# Patient Record
Sex: MALE | Race: WHITE | NOT HISPANIC OR LATINO | Employment: OTHER | URBAN - NONMETROPOLITAN AREA
[De-identification: names, ages, dates, MRNs, and addresses within clinical notes are randomized per-mention and may not be internally consistent; named-entity substitution may affect disease eponyms.]

---

## 2020-05-30 ENCOUNTER — HOSPITAL ENCOUNTER (EMERGENCY)
Facility: OTHER | Age: 70
Discharge: HOME | End: 2020-05-30
Attending: EMERGENCY MEDICINE | Admitting: EMERGENCY MEDICINE
Payer: COMMERCIAL

## 2020-05-30 VITALS
OXYGEN SATURATION: 98 % | TEMPERATURE: 99.2 F | WEIGHT: 180 LBS | SYSTOLIC BLOOD PRESSURE: 131 MMHG | HEART RATE: 63 BPM | DIASTOLIC BLOOD PRESSURE: 74 MMHG | RESPIRATION RATE: 23 BRPM

## 2020-05-30 DIAGNOSIS — E83.52 HYPERCALCEMIA: ICD-10-CM

## 2020-05-30 DIAGNOSIS — R10.13 EPIGASTRIC PAIN: ICD-10-CM

## 2020-05-30 DIAGNOSIS — R11.0 NAUSEA: ICD-10-CM

## 2020-05-30 LAB
ALBUMIN SERPL-MCNC: 4 G/DL (ref 3.5–5.7)
ALBUMIN UR-MCNC: 10 MG/DL
ALP SERPL-CCNC: 58 U/L (ref 34–104)
ALT SERPL W P-5'-P-CCNC: 12 U/L (ref 7–52)
ANION GAP SERPL CALCULATED.3IONS-SCNC: 10 MMOL/L (ref 3–14)
APPEARANCE UR: CLEAR
AST SERPL W P-5'-P-CCNC: 17 U/L (ref 13–39)
BACTERIA #/AREA URNS HPF: ABNORMAL /HPF
BASOPHILS # BLD AUTO: 0 10E9/L (ref 0–0.2)
BASOPHILS NFR BLD AUTO: 0.2 %
BILIRUB SERPL-MCNC: 1.2 MG/DL (ref 0.3–1)
BILIRUB UR QL STRIP: NEGATIVE
BUN SERPL-MCNC: 20 MG/DL (ref 7–25)
CALCIUM SERPL-MCNC: 10.7 MG/DL (ref 8.6–10.3)
CHLORIDE SERPL-SCNC: 103 MMOL/L (ref 98–107)
CO2 SERPL-SCNC: 30 MMOL/L (ref 21–31)
COLOR UR AUTO: YELLOW
CREAT SERPL-MCNC: 0.98 MG/DL (ref 0.7–1.3)
CRP SERPL-MCNC: 1.1 MG/L
DIFFERENTIAL METHOD BLD: ABNORMAL
EOSINOPHIL # BLD AUTO: 0 10E9/L (ref 0–0.7)
EOSINOPHIL NFR BLD AUTO: 0.2 %
ERYTHROCYTE [DISTWIDTH] IN BLOOD BY AUTOMATED COUNT: 13 % (ref 10–15)
GFR SERPL CREATININE-BSD FRML MDRD: 75 ML/MIN/{1.73_M2}
GLUCOSE SERPL-MCNC: 124 MG/DL (ref 70–105)
GLUCOSE UR STRIP-MCNC: NEGATIVE MG/DL
HCT VFR BLD AUTO: 39.9 % (ref 40–53)
HGB BLD-MCNC: 13.4 G/DL (ref 13.3–17.7)
HGB UR QL STRIP: NEGATIVE
IMM GRANULOCYTES # BLD: 0 10E9/L (ref 0–0.4)
IMM GRANULOCYTES NFR BLD: 0.3 %
KETONES UR STRIP-MCNC: 40 MG/DL
LEUKOCYTE ESTERASE UR QL STRIP: NEGATIVE
LYMPHOCYTES # BLD AUTO: 0.9 10E9/L (ref 0.8–5.3)
LYMPHOCYTES NFR BLD AUTO: 7.7 %
MCH RBC QN AUTO: 33.6 PG (ref 26.5–33)
MCHC RBC AUTO-ENTMCNC: 33.6 G/DL (ref 31.5–36.5)
MCV RBC AUTO: 100 FL (ref 78–100)
MONOCYTES # BLD AUTO: 0.9 10E9/L (ref 0–1.3)
MONOCYTES NFR BLD AUTO: 7.7 %
MUCOUS THREADS #/AREA URNS LPF: PRESENT /LPF
NEUTROPHILS # BLD AUTO: 10.1 10E9/L (ref 1.6–8.3)
NEUTROPHILS NFR BLD AUTO: 83.9 %
NITRATE UR QL: NEGATIVE
PH UR STRIP: 6 PH (ref 5–7)
PLATELET # BLD AUTO: 204 10E9/L (ref 150–450)
POTASSIUM SERPL-SCNC: 3.5 MMOL/L (ref 3.5–5.1)
PROT SERPL-MCNC: 6.5 G/DL (ref 6.4–8.9)
RBC # BLD AUTO: 3.99 10E12/L (ref 4.4–5.9)
RBC #/AREA URNS AUTO: <1 /HPF (ref 0–2)
SODIUM SERPL-SCNC: 143 MMOL/L (ref 134–144)
SOURCE: ABNORMAL
SP GR UR STRIP: 1.03 (ref 1–1.03)
UROBILINOGEN UR STRIP-MCNC: NORMAL MG/DL (ref 0–2)
WBC # BLD AUTO: 12 10E9/L (ref 4–11)
WBC #/AREA URNS AUTO: 4 /HPF (ref 0–5)

## 2020-05-30 PROCEDURE — 96374 THER/PROPH/DIAG INJ IV PUSH: CPT | Performed by: EMERGENCY MEDICINE

## 2020-05-30 PROCEDURE — 25000128 H RX IP 250 OP 636: Performed by: EMERGENCY MEDICINE

## 2020-05-30 PROCEDURE — 99283 EMERGENCY DEPT VISIT LOW MDM: CPT | Mod: Z6 | Performed by: EMERGENCY MEDICINE

## 2020-05-30 PROCEDURE — 25800030 ZZH RX IP 258 OP 636: Performed by: EMERGENCY MEDICINE

## 2020-05-30 PROCEDURE — 85025 COMPLETE CBC W/AUTO DIFF WBC: CPT | Performed by: EMERGENCY MEDICINE

## 2020-05-30 PROCEDURE — 25000132 ZZH RX MED GY IP 250 OP 250 PS 637: Performed by: EMERGENCY MEDICINE

## 2020-05-30 PROCEDURE — 36415 COLL VENOUS BLD VENIPUNCTURE: CPT | Performed by: EMERGENCY MEDICINE

## 2020-05-30 PROCEDURE — 99284 EMERGENCY DEPT VISIT MOD MDM: CPT | Mod: 25 | Performed by: EMERGENCY MEDICINE

## 2020-05-30 PROCEDURE — 86140 C-REACTIVE PROTEIN: CPT | Performed by: EMERGENCY MEDICINE

## 2020-05-30 PROCEDURE — 81001 URINALYSIS AUTO W/SCOPE: CPT | Performed by: EMERGENCY MEDICINE

## 2020-05-30 PROCEDURE — 80053 COMPREHEN METABOLIC PANEL: CPT | Performed by: EMERGENCY MEDICINE

## 2020-05-30 PROCEDURE — 96365 THER/PROPH/DIAG IV INF INIT: CPT | Performed by: EMERGENCY MEDICINE

## 2020-05-30 RX ORDER — METOCLOPRAMIDE HYDROCHLORIDE 5 MG/ML
5 INJECTION INTRAMUSCULAR; INTRAVENOUS ONCE
Status: DISCONTINUED | OUTPATIENT
Start: 2020-05-30 | End: 2020-05-30

## 2020-05-30 RX ORDER — FAMOTIDINE 20 MG/1
20 TABLET, FILM COATED ORAL 2 TIMES DAILY
Status: DISCONTINUED | OUTPATIENT
Start: 2020-05-30 | End: 2020-05-30

## 2020-05-30 RX ORDER — FAMOTIDINE 20 MG/1
20 TABLET, FILM COATED ORAL ONCE
Status: COMPLETED | OUTPATIENT
Start: 2020-05-30 | End: 2020-05-30

## 2020-05-30 RX ORDER — METOCLOPRAMIDE HYDROCHLORIDE 5 MG/ML
5 INJECTION INTRAMUSCULAR; INTRAVENOUS ONCE
Status: COMPLETED | OUTPATIENT
Start: 2020-05-30 | End: 2020-05-30

## 2020-05-30 RX ORDER — IBUPROFEN 200 MG
400 TABLET ORAL
COMMUNITY

## 2020-05-30 RX ORDER — ASPIRIN 325 MG
325 TABLET ORAL
COMMUNITY

## 2020-05-30 RX ADMIN — METOCLOPRAMIDE 5 MG: 5 INJECTION, SOLUTION INTRAMUSCULAR; INTRAVENOUS at 21:38

## 2020-05-30 RX ADMIN — FAMOTIDINE 20 MG: 20 TABLET ORAL at 21:34

## 2020-05-30 RX ADMIN — SODIUM CHLORIDE, POTASSIUM CHLORIDE, SODIUM LACTATE AND CALCIUM CHLORIDE 500 ML: 600; 310; 30; 20 INJECTION, SOLUTION INTRAVENOUS at 22:02

## 2020-05-30 RX ADMIN — FAMOTIDINE 20 MG: 20 TABLET ORAL at 22:29

## 2020-05-30 NOTE — ED AVS SNAPSHOT
[facility]  [address]  [place] [address]  Phone:  [phone]  Fax:  [phone]                                    Cam Hammonds   MRN: [id number]    Department:  [facility]   Date of Visit:  5/30/2020           After Visit Summary Signature Page    I have received my discharge instructions, and my questions have been answered. I have discussed any challenges I see with this plan with the nurse or doctor.    ..........................................................................................................................................  Patient/Patient Representative Signature      ..........................................................................................................................................  Patient Representative Print Name and Relationship to Patient    ..................................................               ................................................  Date                                   Time    ..........................................................................................................................................  Reviewed by Signature/Title    ...................................................              ..............................................  Date                                               Time          22EPIC Rev 08/18

## 2020-05-31 NOTE — ED TRIAGE NOTES
Pt arrives to the ED via private car.  Pt states that last night he got bloated, cramping and got worse as the night went on ..  Pt started vomiting that night and got the hiccups.  Pt states the only thing that stopped them was throwing up.  Pt has a hx of acid reflux and states he has pain going across his upper abdomen.  Pt did a virtual visit wit his doc and she gave him a scrip for nausea and this didn't help so his MD stated he should be seen in the ER.

## 2020-05-31 NOTE — ED PROVIDER NOTES
"  History     Chief Complaint   Patient presents with     Abdominal Pain     HPI  Cam Hammonds is a 70 year old male who presented to the emergency department by private vehicle for evaluation of abdominal pain.    Symptoms again around 1700 on 5/29/2020.  This was about 4 hours after his midday meal and came on just as he was having a beer and eating some nuts.  He reports initial symptom was abdominal bloating.  This was followed shortly thereafter by a 5 inch wide band running transversely across his abdomen at the infra costal margin from the right anterior axillary line to the left anterior axillary line.  At no point was the radiation into the chest or into the back or below the level of the umbilicus.  He reports it was a dull ache.  It was intermittent most of the episodes lasted no more than \"10 to 15 seconds\".  He describes it as \"big build up and then it would relax\".  He reports that he would have some episodes back to back but otherwise he could go up to 30 minutes between the associated episodes.  Associated with this was an escalation of his typical gastroesophageal reflux pain which he describes as sharp and burning and he reports that concurrent with the gastroesophageal reflux symptoms he would experience hiccups.  He reports that he discovered that I could \"stop the pickups by forcing myself to vomit\".  He reports that these were not normal hiccups.  He reports that they were associated with a sense that his \"stomach was convulsing\".    The patient reports there is no fever.  He had chills but no rigors.  He reports he has been experiencing stools that are normal for him in terms of frequency and consistency.  No hematochezia or melena.  He denies dysuria or change in urinary frequency or urgency.  No hematuria.  His stream is diminished but he reports it is also unchanged.  As noted there is no complaint of back pain either in midline or para vertebral.  He has had some nausea but no " vomiting that is not self-induced.  No hematemesis or coffee-ground emesis.    The patient reports that overnight he noted that his symptoms were much worse sleeping on his left side more so than the right side.  He reports he is customarily a side sleeper.    The patient has a history of gastroesophageal reflux extending back over period of at least 10 years.  He reports that with careful attention to diet the frequency of this is very low and he estimates that he has had less than 1 episode a week over the last month.  He does report that on on 5/28/2020 he had a fried chicken sandwich and experienced a significant exacerbation of his reflux.  He had to take multiple doses of Tums which provided short-term relief.  He denies any prior history of upper GI tract ulcerative disease.  Risk factors are negative for tobacco.  He does take 325 mg a day of aspirin although he is not certain why he does this and it may well be that this was for primary coronary artery prophylaxis.  If that is so I had a long chat with him about the evidence of this past year suggesting that there is no benefit for primary prophylaxis of coronary ischemia.  I encouraged him to have a discussion with his primary care provider about why he is taking his.  He also consumes at least 1 beer or 1 glass of wine or 1 glass of scotch each day but rarely anything in excess of that.  No history of pancreatitis.  No known history of cholelithiasis or cholecystitis.  No history of inflammatory bowel disease or irritable bowel syndrome.  He reports that 2 years ago he had no sort of food poisoning but that was associated with vomiting and diarrhea and resolved in a period of less than 48 hours.  The only similarity he notes is that some of the pain he is experiencing now is reminiscent of that.  His wife, however,  has not been ill with any similar symptoms.        Allergies:  No Known Allergies    Problem List:    Patient Active Problem List     Diagnosis Date Noted     NSVT (nonsustained ventricular tachycardia) (H) 06/29/2015     Priority: Medium     Acute deep vein thrombosis (DVT) of popliteal vein of left lower extremity (H) 06/15/2015     Priority: Medium     Elevated BP 06/15/2015     Priority: Medium     Macrocytosis without anemia 11/17/2014     Priority: Medium     S/P arthroscopy of shoulder 04/25/2013     Priority: Medium     Complete tear of right rotator cuff 01/16/2013     Priority: Medium     DJD (degenerative joint disease) 03/01/2010     Priority: Medium        Past Medical History:    History reviewed. No pertinent past medical history.    Past Surgical History:    History reviewed. No pertinent surgical history.    Family History:    History reviewed. No pertinent family history.    Social History:  Marital Status:   [2]  Social History     Tobacco Use     Smoking status: Never Smoker     Smokeless tobacco: Never Used   Substance Use Topics     Alcohol use: Yes     Drug use: Never        Medications:    aspirin (ASA) 325 MG tablet  ibuprofen (ADVIL/MOTRIN) 200 MG tablet  MULTIPLE VITAMIN-FOLIC ACID PO  rivaroxaban ANTICOAGULANT (XARELTO) 20 MG TABS tablet  VITAMIN D, CHOLECALCIFEROL, PO          Review of Systemsas noted above    Physical Exam   BP: 124/70  Pulse: 90  Heart Rate: 85  Temp: 99.2  F (37.3  C)  Resp: 16  Weight: 81.6 kg (180 lb)  SpO2: 97 %      Physical Exam The patient's skin is warm and dry. There are no petechiae or purpurae. There is no pallor. There is no jaundice. The skin is intact.  Nailbed capillary refill is brisk. Radial pulses are palpable and are 2+ bilaterally. Dorsalis pedis pulses are also brisk and equal. There is no peripheral edema.  Heart is regular in its rhythm.  Bedside monitor shows him to be in a normal sinus rhythm with rare unifocal PVCs it is regular in its rate. The patient has a normal S1 and S2. No murmur is noted.   Lungs are clear to auscultation. Airflow is adequate. No adventitious  airway sounds noted. The patient is able to speak in full sentences. No lip pursing or expiratory grunting is noted. No accessory muscle use was noted.  Chest wall stable.  No diaphragmatic spasm is observed during the interview or the examination.  Bowel tones are present. There is no high-pitched tinkling. There are no continuous rushes. Belly is not bloated or distended. There is no guarding.  Cicatrix is noted consistent with a prior open cholecystectomy.  Patient reports this happened 40 years ago.  There is  focal tenderness for the abdomen with palpation at a site that is 2-3 fingerbreadths below the costal margin in the midclavicular line and just in the left xiphocostal recess.  Abdominal aorta is palpated.  It is not enlarged.  It is not tender.  Palpation over the area of the takeoff for the left hepatic artery from the celiac artery reportedly makes his reflux/epigastric pain worse but otherwise the exam is nontender.  There is no auscultable bruit noted over the abdominal aorta nor over the renal vessels.. No suprapubic distention or tenderness is noted.  McBurney's sign is negative. Rovsing's sign is negative. Smith's sign is negative. No discrete mass was identified. I did not appreciate any hepatomegaly by palpation or by percussion. No ventral hernia was identified.   Carotids are palpable. There is no auscultable bruit. Cervical AROM is adequate for flexion and rotation. The patient's mandible opened in a symmetric fashion. There is no trismus. There is no swelling of the lips, tongue, or the uvula.  Oral mucosa is moist.  There were no intraoral mucosal lesions identified.  Conjunctivae are neither injected nor pallid. Sclerae are anicteric. Pupils are equal in size. They are 1-2 mm, OU. Gaze is conjugate. Purkinje images are well aligned.   Patient is awake and alert. Speech is fluent and clear. Cognition is sufficient to construct a linear narrative. Fund of knowledge is thought to represent the  patient's baseline. Affect is bright. The patient is cooperative and follows simple commands.  No evidence for any bulbar or peripheral motor deficit.  No evidence for truncal or limb ataxia.    ED Course        Procedures                 Results for orders placed or performed during the hospital encounter of 05/30/20 (from the past 24 hour(s))   UA reflex to Microscopic and Culture    Specimen: Urine clean catch; Midstream Urine   Result Value Ref Range    Color Urine Yellow     Appearance Urine Clear     Glucose Urine Negative NEG^Negative mg/dL    Bilirubin Urine Negative NEG^Negative    Ketones Urine 40 (A) NEG^Negative mg/dL    Specific Gravity Urine 1.028 1.003 - 1.035    Blood Urine Negative NEG^Negative    pH Urine 6.0 5.0 - 7.0 pH    Protein Albumin Urine 10 (A) NEG^Negative mg/dL    Urobilinogen mg/dL Normal 0.0 - 2.0 mg/dL    Nitrite Urine Negative NEG^Negative    Leukocyte Esterase Urine Negative NEG^Negative    Source Midstream Urine     RBC Urine <1 0 - 2 /HPF    WBC Urine 4 0 - 5 /HPF    Bacteria Urine Few (A) NEG^Negative /HPF    Mucous Urine Present (A) NEG^Negative /LPF   Comprehensive metabolic panel   Result Value Ref Range    Sodium 143 134 - 144 mmol/L    Potassium 3.5 3.5 - 5.1 mmol/L    Chloride 103 98 - 107 mmol/L    Carbon Dioxide 30 21 - 31 mmol/L    Anion Gap 10 3 - 14 mmol/L    Glucose 124 (H) 70 - 105 mg/dL    Urea Nitrogen 20 7 - 25 mg/dL    Creatinine 0.98 0.70 - 1.30 mg/dL    GFR Estimate 75 >60 mL/min/[1.73_m2]    GFR Estimate If Black >90 >60 mL/min/[1.73_m2]    Calcium 10.7 (H) 8.6 - 10.3 mg/dL    Bilirubin Total 1.2 (H) 0.3 - 1.0 mg/dL    Albumin 4.0 3.5 - 5.7 g/dL    Protein Total 6.5 6.4 - 8.9 g/dL    Alkaline Phosphatase 58 34 - 104 U/L    ALT 12 7 - 52 U/L    AST 17 13 - 39 U/L   CRP inflammation   Result Value Ref Range    CRP Inflammation 1.1 (H) <0.5 mg/L   CBC with platelets differential   Result Value Ref Range    WBC 12.0 (H) 4.0 - 11.0 10e9/L    RBC Count 3.99 (L) 4.4  - 5.9 10e12/L    Hemoglobin 13.4 13.3 - 17.7 g/dL    Hematocrit 39.9 (L) 40.0 - 53.0 %     78 - 100 fl    MCH 33.6 (H) 26.5 - 33.0 pg    MCHC 33.6 31.5 - 36.5 g/dL    RDW 13.0 10.0 - 15.0 %    Platelet Count 204 150 - 450 10e9/L    Diff Method Automated Method     % Neutrophils 83.9 %    % Lymphocytes 7.7 %    % Monocytes 7.7 %    % Eosinophils 0.2 %    % Basophils 0.2 %    % Immature Granulocytes 0.3 %    Absolute Neutrophil 10.1 (H) 1.6 - 8.3 10e9/L    Absolute Lymphocytes 0.9 0.8 - 5.3 10e9/L    Absolute Monocytes 0.9 0.0 - 1.3 10e9/L    Absolute Eosinophils 0.0 0.0 - 0.7 10e9/L    Absolute Basophils 0.0 0.0 - 0.2 10e9/L    Abs Immature Granulocytes 0.0 0 - 0.4 10e9/L       Medications   metoclopramide (REGLAN) injection 5 mg (5 mg Intravenous Given 5/30/20 2138)   lactated ringers BOLUS 500 mL (0 mLs Intravenous Stopped 5/30/20 2232)   famotidine (PEPCID) tablet 20 mg (20 mg Oral Given 5/30/20 2229)       Assessments & Plan (with Medical Decision Making)     I have reviewed the nursing notes.    I have reviewed the findings, diagnosis, plan and need for follow up with the patient.  From the patient's history his epigastric pain is consistent with his prior episodes of gastroesophageal reflux.  This reflux had been well controlled until 2 days ago when he had fried chicken sandwich.  He has been having worsening symptoms since then which have been responsive to frequent use of Tums but without any sustained relief.  The patient has no prior history for upper GI tract ulcerative disease but between his daily alcohol consumption and his daily aspirin consumption does have some risk factors for the development of gastritis, at the least.  There is nothing to indicate that his symptoms are due to hepatic injury.  He is already had his gallbladder removed and that is 40 years ago my suspicion that he is developed an acute choledocholithiasis is relatively low.  There are no suggestions for acute ascending  cholangitis.  The patient does not have significant risk factors for pancreatitis nor his symptoms suggestive of that.  I do not think that his symptoms represent a subpulmonic empyema nor do I think they represent pneumonia or mediastinitis, myocardial ischemia/injury or pericarditis.  The fact that he does have recurrent hiccups associated with his pain is bit perplexing.  As noted above I do not think of any problems above the level of the diaphragm such as lungs or pericardium as likely sources of inflammation to the diaphragm causing its irritation.  And I have excluded stomach and I think spleen as potential sources of infra diaphragmatic irritation.  I do not think this is related to anything in his large bowel since he has no other lower GI symptomatology and so I can only imagine some inflammation from the stomach.  There is nothing in his neck that would suggest a problem at the level of C3, 4 or 5 to suggest that his he has phrenic nerve injury as an explanation for this.  At this time his hiccups are recurrent but transient and I did not think that he merited a dose of Thorazine.    As for his very modest hypercalcemia tonight, I am choosing not to pursue this at this time.  He is been taking a lot of Tums and this may be the most likely explanation.  If that is the case then when his primary care provider rechecks his calcium sometime over the next several weeks I would expect it to go back to normal.  If not in the range of elevation that he shows I suppose testing for parathyroid abnormalities is not unreasonable.  It is not of a magnitude that I think strongly suggests underlying malignancy.  He certainly has no symptoms of hypercalcemia such as confusion or dehydration nor change in his stools, etc.    Discharge Medication List as of 5/30/2020 10:48 PM          Final diagnoses:   Epigastric pain   Nausea   Hypercalcemia     Plan: Return to the emergency department for intractable vomiting, shortness  of breath, lightheadedness, abdominal rigidity, temperature greater than 101, passage of blood in the vomit or in your stools or worsening abdominal pain.  Talk to your primary care provider about the reason daily aspirin therapy has been recommended.  If it was for primary prophylaxis for coronary ischemia, then your primary care provider may wish to discontinue this.  Pepcid (OTC) 20 mg at bedtime each night for the next 2 weeks.    5/30/2020   [facility]  05/31/20 0254

## 2020-05-31 NOTE — DISCHARGE INSTRUCTIONS
Return to the emergency department for intractable vomiting, shortness of breath, lightheadedness, abdominal rigidity, temperature greater than 101, passage of blood in the vomit or in your stools or worsening abdominal pain.  Talk to your primary care provider about the reason daily aspirin therapy has been recommended.  If it was for primary prophylaxis for coronary ischemia, then your primary care provider may wish to discontinue this.  Pepcid (OTC) 20 mg at bedtime each night for the next 2 weeks.

## 2021-01-04 ENCOUNTER — HEALTH MAINTENANCE LETTER (OUTPATIENT)
Age: 71
End: 2021-01-04

## 2021-10-10 ENCOUNTER — HEALTH MAINTENANCE LETTER (OUTPATIENT)
Age: 71
End: 2021-10-10

## 2022-01-30 ENCOUNTER — HEALTH MAINTENANCE LETTER (OUTPATIENT)
Age: 72
End: 2022-01-30

## 2022-09-24 ENCOUNTER — HEALTH MAINTENANCE LETTER (OUTPATIENT)
Age: 72
End: 2022-09-24

## 2023-05-08 ENCOUNTER — HEALTH MAINTENANCE LETTER (OUTPATIENT)
Age: 73
End: 2023-05-08

## 2024-11-14 ENCOUNTER — OFFICE VISIT (OUTPATIENT)
Dept: FAMILY MEDICINE | Facility: OTHER | Age: 74
End: 2024-11-14
Attending: STUDENT IN AN ORGANIZED HEALTH CARE EDUCATION/TRAINING PROGRAM
Payer: MEDICARE

## 2024-11-14 VITALS
BODY MASS INDEX: 30.12 KG/M2 | HEART RATE: 69 BPM | HEIGHT: 66 IN | OXYGEN SATURATION: 97 % | TEMPERATURE: 97.7 F | RESPIRATION RATE: 18 BRPM | WEIGHT: 187.4 LBS | DIASTOLIC BLOOD PRESSURE: 74 MMHG | SYSTOLIC BLOOD PRESSURE: 129 MMHG

## 2024-11-14 DIAGNOSIS — H61.21 IMPACTED CERUMEN OF RIGHT EAR: Primary | ICD-10-CM

## 2024-11-14 PROCEDURE — 69209 REMOVE IMPACTED EAR WAX UNI: CPT | Performed by: NURSE PRACTITIONER

## 2024-11-14 ASSESSMENT — ENCOUNTER SYMPTOMS
EYES NEGATIVE: 1
HEMATOLOGIC/LYMPHATIC NEGATIVE: 1
CARDIOVASCULAR NEGATIVE: 1
PSYCHIATRIC NEGATIVE: 1
CONSTITUTIONAL NEGATIVE: 1
MUSCULOSKELETAL NEGATIVE: 1
RESPIRATORY NEGATIVE: 1
GASTROINTESTINAL NEGATIVE: 1
NEUROLOGICAL NEGATIVE: 1

## 2024-11-14 ASSESSMENT — PAIN SCALES - GENERAL: PAINLEVEL_OUTOF10: NO PAIN (0)

## 2024-11-14 NOTE — PROGRESS NOTES
Cam Hammonds  1950    ASSESSMENT/PLAN      Presents to [facility] clinic with right ear cerumen impaction.  Right ear was irrigated by nursing staff with resolution of ear cerumen impaction.  Patient's vitals are stable and he appears nontoxic.        1. Impacted cerumen of right ear (Primary)     - Ear was flushed / irrigated with removal of cerumen impaction by nursing staff.  - May use over-the-counter Tylenol or ibuprofen PRN  - Follow up as needed for new or worsening symptoms          *Explanation of diagnosis, treatment options and risk and benefits of medications reviewed with patient. Patient agrees with plan of care.  *All questions were answered.    *Red flags symptoms were discussed and patient was advised when they should return for reevaluation or for prompt emergency evaluation.   *Patient was given verbal and written instructions on plan of care. Instructions were printed or are available on Mychart on electronic AVS.   *We discussed potential side effects of any prescribed or recommended therapies, as well as expectations for response to treatments.  *Patient discharged in stable condition    Elaine Hong CNP  [facility]    SUBJECTIVE  CHIEF COMPLAINT/ REASON FOR VISIT  Patient presents with:  Ear flush     HISTORY OF PRESENT ILLNESS  Cam Hammonds is a pleasant 74 year old male presents to [facility] clinic today with right ear fullness, right ear cerumen impaction.  No other symptoms.    I have reviewed the nursing notes.  I have reviewed allergies, medication list, problem list, and past medical history.    REVIEW OF SYSTEMS  Review of Systems   Constitutional: Negative.    HENT:  Positive for ear pain.    Eyes: Negative.    Respiratory: Negative.     Cardiovascular: Negative.    Gastrointestinal: Negative.    Genitourinary: Negative.    Musculoskeletal: Negative.    Skin: Negative.    Neurological: Negative.    Hematological: Negative.    Psychiatric/Behavioral:  "Negative.     All other systems reviewed and are negative.       VITAL SIGNS  Vitals:    11/14/24 1124   BP: 129/74   BP Location: Right arm   Patient Position: Sitting   Cuff Size: Adult Regular   Pulse: 69   Resp: 18   Temp: 97.7  F (36.5  C)   TempSrc: Temporal   SpO2: 97%   Weight: 85 kg (187 lb 6.4 oz)   Height: 1.664 m (5' 5.5\")      Body mass index is 30.71 kg/m .      OBJECTIVE  PHYSICAL EXAM  Physical Exam  Vitals and nursing note reviewed.   Constitutional:       Appearance: Normal appearance.   HENT:      Head: Normocephalic.      Right Ear: Tympanic membrane normal. There is impacted cerumen.      Left Ear: Tympanic membrane normal.      Nose: Nose normal.      Mouth/Throat:      Mouth: Mucous membranes are moist.   Eyes:      Pupils: Pupils are equal, round, and reactive to light.   Cardiovascular:      Rate and Rhythm: Normal rate.      Pulses: Normal pulses.   Pulmonary:      Effort: Pulmonary effort is normal.   Musculoskeletal:         General: Normal range of motion.   Skin:     General: Skin is warm and dry.      Capillary Refill: Capillary refill takes less than 2 seconds.   Neurological:      General: No focal deficit present.      Mental Status: He is alert.            "

## 2024-11-14 NOTE — NURSING NOTE
"Chief Complaint   Patient presents with    Ear flush   Patient presents to the [facility] clinic today for concerns of an ear flush. Patient was at a hearing appointment earlier in the week and was told he needed to clean his ears and he is concerned he pushed the wax back farther. Patient also needs to fly in a few days so would like his ears clean.     Initial /74 (BP Location: Right arm, Patient Position: Sitting, Cuff Size: Adult Regular)   Pulse 69   Temp 97.7  F (36.5  C) (Temporal)   Resp 18   Ht 1.664 m (5' 5.5\")   Wt 85 kg (187 lb 6.4 oz)   SpO2 97%   BMI 30.71 kg/m   Estimated body mass index is 30.71 kg/m  as calculated from the following:    Height as of this encounter: 1.664 m (5' 5.5\").    Weight as of this encounter: 85 kg (187 lb 6.4 oz).  Medication Review: complete    The next two questions are to help us understand your food security.  If you are feeling you need any assistance in this area, we have resources available to support you today.          11/14/2024   SDOH- Food Insecurity   Within the past 12 months, did you worry that your food would run out before you got money to buy more? N   Within the past 12 months, did the food you bought just not last and you didn t have money to get more? N            Health Care Directive:  Patient does not have a Health Care Directive: Discussed advance care planning with patient; however, patient declined at this time.    Lester Dumont      "

## 2024-11-15 NOTE — PROGRESS NOTES
Patient identified using two patient identifiers.  Ear exam showing wax occlusion completed by provider.  Solution: warm water was placed in the right ear(s) via irrigation tool: asa gaona.    Jacquelyn Bynum LPN on 11/14/2024 at 6:04 PM

## 2025-03-15 ENCOUNTER — HEALTH MAINTENANCE LETTER (OUTPATIENT)
Age: 75
End: 2025-03-15

## (undated) RX ORDER — SODIUM CHLORIDE, SODIUM LACTATE, POTASSIUM CHLORIDE, CALCIUM CHLORIDE 600; 310; 30; 20 MG/100ML; MG/100ML; MG/100ML; MG/100ML
INJECTION, SOLUTION INTRAVENOUS
Status: DISPENSED
Start: 2020-05-30

## (undated) RX ORDER — FAMOTIDINE 20 MG/1
TABLET, FILM COATED ORAL
Status: DISPENSED
Start: 2020-05-30

## (undated) RX ORDER — METOCLOPRAMIDE HYDROCHLORIDE 5 MG/ML
INJECTION INTRAMUSCULAR; INTRAVENOUS
Status: DISPENSED
Start: 2020-05-30